# Patient Record
(demographics unavailable — no encounter records)

---

## 2025-01-31 NOTE — CONSULT LETTER
[Today's Date] : [unfilled] [Name] : Name: [unfilled] [] : : ~~ [Today's Date:] : [unfilled] [Consult] : I had the pleasure of evaluating your patient, [unfilled]. My full evaluation follows. [Consult - Single Provider] : Thank you very much for allowing me to participate in the care of this patient. If you have any questions, please do not hesitate to contact me. [Sincerely,] : Sincerely, [Dear  ___:] : Dear Dr. [unfilled]: [DrOtoniel  ___] : Dr. LARSEN [FreeTextEntry4] : Dr. Charles Alvares   [FreeTextEntry5] : [address], [place], [place] [FreeTextEntry6] : [phone] [de-identified] : Errol Dahl MD Congenital interventional Cardiologist [facility] , [facility] [employer] [facility] Telephone: [phone] [phone]

## 2025-01-31 NOTE — CARDIOLOGY SUMMARY
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  Left ventricular function is well preserved.  There is increased transmitral flow likely from increased volume.  No VSD seen.  Atrial communication as left-to-right shunt of slightly increased velocity suggesting left atrial hypertension.  Full report to follow.

## 2025-01-31 NOTE — CONSULT LETTER
[Today's Date] : [unfilled] [Name] : Name: [unfilled] [] : : ~~ [Today's Date:] : [unfilled] [Consult] : I had the pleasure of evaluating your patient, [unfilled]. My full evaluation follows. [Consult - Single Provider] : Thank you very much for allowing me to participate in the care of this patient. If you have any questions, please do not hesitate to contact me. [Sincerely,] : Sincerely, [Dear  ___:] : Dear Dr. [unfilled]: [DrOtoniel  ___] : Dr. LARSEN [FreeTextEntry4] : Dr. Charles Alvares   [FreeTextEntry5] : [address], [place], [place] [FreeTextEntry6] : [phone] [de-identified] : Errol Dahl MD Congenital interventional Cardiologist [facility] , [facility] [employer] Telephone: [phone] [phone]

## 2025-01-31 NOTE — PHYSICAL EXAM
[General Appearance - Alert] : alert [General Appearance - Well Nourished] : well nourished [General Appearance - Well Developed] : well developed [Appearance Of Head] : the head was normocephalic [Facies] : there were no dysmorphic facial features [Sclera] : the conjunctiva were normal [Outer Ear] : the ears and nose were normal in appearance [Examination Of The Oral Cavity] : mucous membranes were moist and pink [Auscultation Breath Sounds / Voice Sounds] : breath sounds clear to auscultation bilaterally [Apical Impulse] : quiet precordium with normal apical impulse [Normal Chest Appearance] : the chest was normal in appearance [Heart Rate And Rhythm] : normal heart rate and rhythm [Heart Sounds] : normal S1 and S2 [Heart Sounds Gallop] : no gallops [Heart Sounds Pericardial Friction Rub] : no pericardial rub [Edema] : no edema [Heart Sounds Click] : no clicks [Capillary Refill Test] : normal capillary refill [2+] : right 2+ [1+] : left 1+ [Systolic] : systolic [III] : a grade 3/6   [LUSB] : LUSB [Abdomen Soft] : soft [Bowel Sounds] : normal bowel sounds [Nondistended] : nondistended [Abdomen Tenderness] : non-tender [Nail Clubbing] : no clubbing  or cyanosis of the fingernails [Motor Tone] : normal muscle strength and tone [Cervical Lymph Nodes Enlarged Anterior] : The anterior cervical nodes were normal [Cervical Lymph Nodes Enlarged Posterior] : The posterior cervical nodes were normal [] : no rash [Skin Lesions] : no lesions [Demonstrated Behavior - Infant Nonreactive To Parents] : interactive [Skin Turgor] : normal turgor [Mood] : mood and affect were appropriate for age [Demonstrated Behavior] : normal behavior [FreeTextEntry1] : mildly tachypneic

## 2025-01-31 NOTE — DISCUSSION/SUMMARY
[FreeTextEntry1] : In summary, Keith is 2 months old infant who has moderate-sized PDA with coarctation of the aorta that needs surgical repair.  Baby is stable from a cardiac standpoint with preserved left ventricular function.  I have discussed the above findings with both parents and recommended hospitalization for surgery.  Inpatient cardiology team as well as cardiac intensivist informed.  Further management as per CICU team.

## 2025-01-31 NOTE — CARDIOLOGY SUMMARY
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  Left ventricular function is well preserved.  There is increased transmitral flow likely from increased volume.  No VSD seen.  Atrial communication as left-to-right shunt of slightly increased velocity suggesting left atrial hypertension.  Full report to follow. [FreeTextEntry1] : Normal sinus rhythm and evidence of biventricular hypertrophy

## 2025-01-31 NOTE — HISTORY OF PRESENT ILLNESS
[FreeTextEntry1] : I had the opportunity of evaluating Keith in our pediatric cardiology clinic today on January 31, 2025.  He is referred by Jany Tyson with a diagnosis of small secundum ASD small muscular VSD and a moderate-sized PDA diagnosed at 1 month of age for murmur evaluation.  Baby is now 2 months old who is feeding well and gaining weight but lately has increased work of breathing and diaphoresis according to parents.  Patient was referred for transcatheter closure of PDA.Baby is on no medications.

## 2025-01-31 NOTE — PHYSICAL EXAM
[General Appearance - Alert] : alert [General Appearance - Well Nourished] : well nourished [General Appearance - Well Developed] : well developed [Facies] : there were no dysmorphic facial features [Appearance Of Head] : the head was normocephalic [Sclera] : the conjunctiva were normal [Examination Of The Oral Cavity] : mucous membranes were moist and pink [Outer Ear] : the ears and nose were normal in appearance [Auscultation Breath Sounds / Voice Sounds] : breath sounds clear to auscultation bilaterally [Apical Impulse] : quiet precordium with normal apical impulse [Normal Chest Appearance] : the chest was normal in appearance [Heart Rate And Rhythm] : normal heart rate and rhythm [Heart Sounds] : normal S1 and S2 [Heart Sounds Gallop] : no gallops [Heart Sounds Pericardial Friction Rub] : no pericardial rub [Edema] : no edema [Heart Sounds Click] : no clicks [Capillary Refill Test] : normal capillary refill [2+] : right 2+ [1+] : left 1+ [Systolic] : systolic [III] : a grade 3/6   [LUSB] : LUSB [Bowel Sounds] : normal bowel sounds [Abdomen Soft] : soft [Nondistended] : nondistended [Abdomen Tenderness] : non-tender [Nail Clubbing] : no clubbing  or cyanosis of the fingers [Motor Tone] : normal muscle strength and tone [Cervical Lymph Nodes Enlarged Anterior] : The anterior cervical nodes were normal [Cervical Lymph Nodes Enlarged Posterior] : The posterior cervical nodes were normal [] : no rash [Skin Lesions] : no lesions [Skin Turgor] : normal turgor [Demonstrated Behavior - Infant Nonreactive To Parents] : interactive [Mood] : mood and affect were appropriate for age [Demonstrated Behavior] : normal behavior [FreeTextEntry1] : mildly tachypneic

## 2025-01-31 NOTE — REVIEW OF SYSTEMS
[___ Formula] : [unfilled] Formula  [___ ounces/feeding] : ~ROSALINA bose/feeding [___ Times/day] : [unfilled] times/day

## 2025-02-13 NOTE — ASSESSMENT
[FreeTextEntry1] : Keith has been doing well at home and parents report occasional pain  for which they give a dose of tylenol.  He continues on enalapril and will see Dr. Heart next week.  ECHO today shows no resiudal coarctation, no gradient and no effusion.  On exam, there is no BP gradient.  He has been eating well without parental concern.  Incision today is CDI.  Dermabond removed from incision.  Healing well.  Incision care reviewed with family and all questions answered.

## 2025-02-13 NOTE — PHYSICAL EXAM
[Clean] : clean [Dry] : dry [Healing Well] : healing well [] : no respiratory distress [Respiration, Rhythm And Depth] : normal respiratory rhythm and effort [Exaggerated Use Of Accessory Muscles For Inspiration] : no accessory muscle use [Apical Impulse] : the apical impulse was normal [Heart Rate And Rhythm] : heart rate was normal and rhythm regular [Heart Sounds] : normal S1 and S2

## 2025-02-13 NOTE — REASON FOR VISIT
[Parent] : parent [de-identified] :  end to end anastamosis  [de-identified] : 2/4/25 [de-identified] : 9 [de-identified] : Keith is a 2 month old male with coarctation of the aorta who underwent end to end anastomosis via left thoracotomy with Dr. Bonilla on 2/4/25.  His course was complicated by low out chyle.  He was continued on home feeds and chest tube was pulled on POD 3.  He was discharged home shortly after on enalapril.